# Patient Record
Sex: FEMALE | Race: WHITE | NOT HISPANIC OR LATINO | URBAN - METROPOLITAN AREA
[De-identification: names, ages, dates, MRNs, and addresses within clinical notes are randomized per-mention and may not be internally consistent; named-entity substitution may affect disease eponyms.]

---

## 2023-08-16 ENCOUNTER — APPOINTMENT (OUTPATIENT)
Dept: RADIOLOGY | Facility: IMAGING CENTER | Age: 33
End: 2023-08-16
Attending: STUDENT IN AN ORGANIZED HEALTH CARE EDUCATION/TRAINING PROGRAM
Payer: COMMERCIAL

## 2023-08-16 ENCOUNTER — OFFICE VISIT (OUTPATIENT)
Dept: URGENT CARE | Facility: CLINIC | Age: 33
End: 2023-08-16
Payer: COMMERCIAL

## 2023-08-16 VITALS
BODY MASS INDEX: 43.32 KG/M2 | SYSTOLIC BLOOD PRESSURE: 136 MMHG | HEART RATE: 76 BPM | WEIGHT: 260 LBS | HEIGHT: 65 IN | DIASTOLIC BLOOD PRESSURE: 86 MMHG | TEMPERATURE: 97.6 F | RESPIRATION RATE: 18 BRPM | OXYGEN SATURATION: 96 %

## 2023-08-16 DIAGNOSIS — M79.672 LEFT FOOT PAIN: ICD-10-CM

## 2023-08-16 DIAGNOSIS — M77.42 METATARSALGIA OF LEFT FOOT: ICD-10-CM

## 2023-08-16 PROCEDURE — 99203 OFFICE O/P NEW LOW 30 MIN: CPT | Performed by: STUDENT IN AN ORGANIZED HEALTH CARE EDUCATION/TRAINING PROGRAM

## 2023-08-16 PROCEDURE — 3075F SYST BP GE 130 - 139MM HG: CPT | Performed by: STUDENT IN AN ORGANIZED HEALTH CARE EDUCATION/TRAINING PROGRAM

## 2023-08-16 PROCEDURE — 3079F DIAST BP 80-89 MM HG: CPT | Performed by: STUDENT IN AN ORGANIZED HEALTH CARE EDUCATION/TRAINING PROGRAM

## 2023-08-16 PROCEDURE — 73630 X-RAY EXAM OF FOOT: CPT | Mod: TC,LT | Performed by: STUDENT IN AN ORGANIZED HEALTH CARE EDUCATION/TRAINING PROGRAM

## 2023-08-16 RX ORDER — MELOXICAM 15 MG/1
TABLET ORAL
Qty: 30 TABLET | Refills: 0 | Status: SHIPPED | OUTPATIENT
Start: 2023-08-16 | End: 2023-09-28

## 2023-08-16 RX ORDER — LAMOTRIGINE 100 MG/1
TABLET ORAL
COMMUNITY
Start: 2022-07-01 | End: 2025-06-25

## 2023-08-16 RX ORDER — TRAZODONE HYDROCHLORIDE 100 MG/1
100 TABLET ORAL
COMMUNITY
Start: 2023-06-26 | End: 2025-06-25

## 2023-08-16 RX ORDER — SUMATRIPTAN 50 MG/1
TABLET, FILM COATED ORAL
COMMUNITY
Start: 2022-11-23 | End: 2023-09-28 | Stop reason: SDUPTHER

## 2023-08-16 RX ORDER — PROPRANOLOL HCL 60 MG
60 CAPSULE, EXTENDED RELEASE 24HR ORAL DAILY
COMMUNITY
Start: 2023-04-14 | End: 2023-09-28

## 2023-08-16 RX ORDER — ALBUTEROL SULFATE 90 UG/1
AEROSOL, METERED RESPIRATORY (INHALATION)
COMMUNITY
Start: 2023-06-26 | End: 2025-06-25

## 2023-08-16 ASSESSMENT — FIBROSIS 4 INDEX: FIB4 SCORE: 0.48

## 2023-08-16 NOTE — LETTER
August 16, 2023       Patient: Karina Thomason   YOB: 1990   Date of Visit: 8/16/2023         To Whom It May Concern:    Karina Thomason was seen in urgent care today. I recommend she wear comfortable tennis shoes until following up with podiatry.     If you have any questions or concerns, please don't hesitate to call [phone]          Sincerely,          Santhosh Smith D.O.  Electronically Signed

## 2023-08-17 NOTE — PROGRESS NOTES
Subjective:   CHIEF COMPLAINT  Chief Complaint   Patient presents with    Foot Injury     Left foot injury x few weeks       HPI  Karina Thomason is a 33 y.o. female who presents with a chief complaint of left foot pain x1 month.  Pain is localized to the plantar aspect of the fifth metatarsal phalangeal joint.  Patient says every weekend for the last month she has been moving from one house in [place] to a new place in [place].  Describes discomfort as sharp pain that is aggravated with weightbearing.  Symptoms improved with sitting and nonweightbearing.  She has also tried Tylenol and ibuprofen.  No trauma or injury.  No history of similar symptoms.  No bruising or swelling.  No history of surgery to her foot.    REVIEW OF SYSTEMS  General: no fever or chills  GI: no nausea or vomiting  See HPI for further details.    PAST MEDICAL HISTORY       SURGICAL HISTORY  patient denies any surgical history    ALLERGIES  Allergies   Allergen Reactions    Sulfamethoxazole W-Trimethoprim Unspecified, Nausea, Hives and Vomiting       CURRENT MEDICATIONS  Home Medications       Reviewed by Santhosh Smith D.O. (Physician) on 08/17/23 at 0823  Med List Status: <None>     Medication Last Dose Status   albuterol 108 (90 Base) MCG/ACT Aero Soln inhalation aerosol PRN Active   diclofenac sodium (VOLTAREN) 1 % Gel  Active   lamoTRIgine (LAMICTAL) 100 MG Tab Taking Active   meloxicam (MOBIC) 15 MG tablet  Active   propranolol LA (INDERAL LA) 60 MG CAPSULE SR 24 HR Taking Active   SUMAtriptan (IMITREX) 50 MG Tab Taking Active   traZODone (DESYREL) 100 MG Tab Taking Active                    SOCIAL HISTORY  Social History     Tobacco Use    Smoking status: Never    Smokeless tobacco: Never   Substance and Sexual Activity    Alcohol use: Never    Drug use: Never    Sexual activity: Not on file       FAMILY HISTORY  No family history on file.       Objective:   PHYSICAL EXAM  VITAL SIGNS: /86 (BP Location: Right arm, Patient  "Position: Sitting, BP Cuff Size: Adult)   Pulse 76   Temp 36.4 °C (97.6 °F) (Temporal)   Resp 18   Ht 1.651 m (5' 5\")   Wt 118 kg (260 lb)   SpO2 96%   BMI 43.27 kg/m²     Gen: no acute distress, normal voice  Skin: dry, intact, moist mucosal membranes  Eyes: No conjunctival injection b/l  Neck: Normal range of motion. No meningeal signs.   Lungs: No increased work of breathing.  CTAB w/ symmetric expansion  CV: RRR w/o murmurs or clicks  MSK: Left foot/ankle: No erythema, ecchymosis or edema.  No loss of ROM of the tibiotalar joint.  No focal tenderness to palpation along any osseous structures or soft tissue; unable to reproduce symptoms.  No motor or sensory deficits      RADIOLOGY RESULTS   DX-FOOT-COMPLETE 3+ LEFT    Result Date: 8/16/2023 8/16/2023 5:36 PM HISTORY/REASON FOR EXAM:  Atraumatic Pain/Swelling/Deformity TECHNIQUE/EXAM DESCRIPTION AND NUMBER OF VIEWS: 3 views of the LEFT foot. COMPARISON:  None. FINDINGS:  No acute fracture is noted. There is no dislocation.  No bone erosion is noted.     No acute osseous abnormality.             Assessment/Plan:     1. Left foot pain  DX-FOOT-COMPLETE 3+ LEFT      2. Metatarsalgia of left foot  meloxicam (MOBIC) 15 MG tablet    Referral to Podiatry    diclofenac sodium (VOLTAREN) 1 % Gel      X-rays negative for any acute osseous abnormalities.  -Ordered Mobic  -Ordered Voltaren gel  -Ordered referral to podiatry  -Instructed to wear supportive shoes with insoles; provided a note for her employer  - Return to urgent care any new/worsening symptoms or further questions or concerns.  Patient understood everything discussed.  All questions were answered.      Differential diagnosis and supportive care discussed. Follow-up as needed if symptoms worsen or fail to improve to PCP, Urgent care or Emergency Room.    Please note that this dictation was created using voice recognition software. I have made a reasonable attempt to correct obvious errors, but I expect " that there are errors of grammar and possibly content that I did not discover before finalizing the note.

## 2023-09-27 SDOH — ECONOMIC STABILITY: TRANSPORTATION INSECURITY
IN THE PAST 12 MONTHS, HAS THE LACK OF TRANSPORTATION KEPT YOU FROM MEDICAL APPOINTMENTS OR FROM GETTING MEDICATIONS?: NO

## 2023-09-27 SDOH — ECONOMIC STABILITY: HOUSING INSECURITY
IN THE LAST 12 MONTHS, WAS THERE A TIME WHEN YOU DID NOT HAVE A STEADY PLACE TO SLEEP OR SLEPT IN A SHELTER (INCLUDING NOW)?: NO

## 2023-09-27 SDOH — ECONOMIC STABILITY: TRANSPORTATION INSECURITY
IN THE PAST 12 MONTHS, HAS LACK OF TRANSPORTATION KEPT YOU FROM MEETINGS, WORK, OR FROM GETTING THINGS NEEDED FOR DAILY LIVING?: NO

## 2023-09-27 SDOH — HEALTH STABILITY: PHYSICAL HEALTH: ON AVERAGE, HOW MANY MINUTES DO YOU ENGAGE IN EXERCISE AT THIS LEVEL?: 0 MIN

## 2023-09-27 SDOH — ECONOMIC STABILITY: HOUSING INSECURITY: IN THE LAST 12 MONTHS, HOW MANY PLACES HAVE YOU LIVED?: 2

## 2023-09-27 SDOH — ECONOMIC STABILITY: INCOME INSECURITY: IN THE LAST 12 MONTHS, WAS THERE A TIME WHEN YOU WERE NOT ABLE TO PAY THE MORTGAGE OR RENT ON TIME?: NO

## 2023-09-27 SDOH — ECONOMIC STABILITY: FOOD INSECURITY: WITHIN THE PAST 12 MONTHS, YOU WORRIED THAT YOUR FOOD WOULD RUN OUT BEFORE YOU GOT MONEY TO BUY MORE.: NEVER TRUE

## 2023-09-27 SDOH — ECONOMIC STABILITY: TRANSPORTATION INSECURITY
IN THE PAST 12 MONTHS, HAS LACK OF RELIABLE TRANSPORTATION KEPT YOU FROM MEDICAL APPOINTMENTS, MEETINGS, WORK OR FROM GETTING THINGS NEEDED FOR DAILY LIVING?: NO

## 2023-09-27 SDOH — ECONOMIC STABILITY: FOOD INSECURITY: WITHIN THE PAST 12 MONTHS, THE FOOD YOU BOUGHT JUST DIDN'T LAST AND YOU DIDN'T HAVE MONEY TO GET MORE.: NEVER TRUE

## 2023-09-27 SDOH — ECONOMIC STABILITY: INCOME INSECURITY: HOW HARD IS IT FOR YOU TO PAY FOR THE VERY BASICS LIKE FOOD, HOUSING, MEDICAL CARE, AND HEATING?: NOT HARD AT ALL

## 2023-09-27 SDOH — HEALTH STABILITY: PHYSICAL HEALTH: ON AVERAGE, HOW MANY DAYS PER WEEK DO YOU ENGAGE IN MODERATE TO STRENUOUS EXERCISE (LIKE A BRISK WALK)?: 0 DAYS

## 2023-09-27 SDOH — HEALTH STABILITY: MENTAL HEALTH
STRESS IS WHEN SOMEONE FEELS TENSE, NERVOUS, ANXIOUS, OR CAN'T SLEEP AT NIGHT BECAUSE THEIR MIND IS TROUBLED. HOW STRESSED ARE YOU?: TO SOME EXTENT

## 2023-09-27 ASSESSMENT — SOCIAL DETERMINANTS OF HEALTH (SDOH)
HOW OFTEN DO YOU HAVE SIX OR MORE DRINKS ON ONE OCCASION: NEVER
WITHIN THE PAST 12 MONTHS, YOU WORRIED THAT YOUR FOOD WOULD RUN OUT BEFORE YOU GOT THE MONEY TO BUY MORE: NEVER TRUE
ARE YOU MARRIED, WIDOWED, DIVORCED, SEPARATED, NEVER MARRIED, OR LIVING WITH A PARTNER?: LIVING WITH PARTNER
HOW OFTEN DO YOU ATTEND CHURCH OR RELIGIOUS SERVICES?: NEVER
DO YOU BELONG TO ANY CLUBS OR ORGANIZATIONS SUCH AS CHURCH GROUPS UNIONS, FRATERNAL OR ATHLETIC GROUPS, OR SCHOOL GROUPS?: NO
HOW OFTEN DO YOU GET TOGETHER WITH FRIENDS OR RELATIVES?: NEVER
IN A TYPICAL WEEK, HOW MANY TIMES DO YOU TALK ON THE PHONE WITH FAMILY, FRIENDS, OR NEIGHBORS?: NEVER
HOW OFTEN DO YOU ATTENT MEETINGS OF THE CLUB OR ORGANIZATION YOU BELONG TO?: NEVER
HOW OFTEN DO YOU GET TOGETHER WITH FRIENDS OR RELATIVES?: NEVER
IN A TYPICAL WEEK, HOW MANY TIMES DO YOU TALK ON THE PHONE WITH FAMILY, FRIENDS, OR NEIGHBORS?: NEVER
DO YOU BELONG TO ANY CLUBS OR ORGANIZATIONS SUCH AS CHURCH GROUPS UNIONS, FRATERNAL OR ATHLETIC GROUPS, OR SCHOOL GROUPS?: NO
ARE YOU MARRIED, WIDOWED, DIVORCED, SEPARATED, NEVER MARRIED, OR LIVING WITH A PARTNER?: LIVING WITH PARTNER
HOW MANY DRINKS CONTAINING ALCOHOL DO YOU HAVE ON A TYPICAL DAY WHEN YOU ARE DRINKING: 1 OR 2
HOW OFTEN DO YOU ATTEND CHURCH OR RELIGIOUS SERVICES?: NEVER
HOW OFTEN DO YOU ATTENT MEETINGS OF THE CLUB OR ORGANIZATION YOU BELONG TO?: NEVER
HOW HARD IS IT FOR YOU TO PAY FOR THE VERY BASICS LIKE FOOD, HOUSING, MEDICAL CARE, AND HEATING?: NOT HARD AT ALL
HOW OFTEN DO YOU HAVE A DRINK CONTAINING ALCOHOL: MONTHLY OR LESS

## 2023-09-27 ASSESSMENT — LIFESTYLE VARIABLES
HOW OFTEN DO YOU HAVE A DRINK CONTAINING ALCOHOL: MONTHLY OR LESS
SKIP TO QUESTIONS 9-10: 1
HOW MANY STANDARD DRINKS CONTAINING ALCOHOL DO YOU HAVE ON A TYPICAL DAY: 1 OR 2
AUDIT-C TOTAL SCORE: 1
HOW OFTEN DO YOU HAVE SIX OR MORE DRINKS ON ONE OCCASION: NEVER

## 2023-09-28 ENCOUNTER — OFFICE VISIT (OUTPATIENT)
Dept: MEDICAL GROUP | Facility: PHYSICIAN GROUP | Age: 33
End: 2023-09-28
Payer: COMMERCIAL

## 2023-09-28 VITALS
HEART RATE: 71 BPM | TEMPERATURE: 97.4 F | DIASTOLIC BLOOD PRESSURE: 76 MMHG | RESPIRATION RATE: 16 BRPM | HEIGHT: 65 IN | OXYGEN SATURATION: 96 % | WEIGHT: 260.36 LBS | BODY MASS INDEX: 43.38 KG/M2 | SYSTOLIC BLOOD PRESSURE: 126 MMHG

## 2023-09-28 DIAGNOSIS — G47.09 OTHER INSOMNIA: ICD-10-CM

## 2023-09-28 DIAGNOSIS — F31.81 BIPOLAR 2 DISORDER (HCC): ICD-10-CM

## 2023-09-28 DIAGNOSIS — Z80.3 FAMILY HISTORY OF BREAST CANCER: ICD-10-CM

## 2023-09-28 DIAGNOSIS — Z97.5 IUD (INTRAUTERINE DEVICE) IN PLACE: ICD-10-CM

## 2023-09-28 DIAGNOSIS — G43.819 OTHER MIGRAINE WITHOUT STATUS MIGRAINOSUS, INTRACTABLE: ICD-10-CM

## 2023-09-28 PROBLEM — G43.909 MIGRAINE: Status: ACTIVE | Noted: 2023-09-28

## 2023-09-28 PROCEDURE — 3074F SYST BP LT 130 MM HG: CPT | Performed by: STUDENT IN AN ORGANIZED HEALTH CARE EDUCATION/TRAINING PROGRAM

## 2023-09-28 PROCEDURE — 99395 PREV VISIT EST AGE 18-39: CPT | Performed by: STUDENT IN AN ORGANIZED HEALTH CARE EDUCATION/TRAINING PROGRAM

## 2023-09-28 PROCEDURE — 99214 OFFICE O/P EST MOD 30 MIN: CPT | Mod: 25 | Performed by: STUDENT IN AN ORGANIZED HEALTH CARE EDUCATION/TRAINING PROGRAM

## 2023-09-28 PROCEDURE — 3078F DIAST BP <80 MM HG: CPT | Performed by: STUDENT IN AN ORGANIZED HEALTH CARE EDUCATION/TRAINING PROGRAM

## 2023-09-28 RX ORDER — LITHIUM CARBONATE 300 MG
300 TABLET ORAL
COMMUNITY
End: 2023-09-28

## 2023-09-28 RX ORDER — PROPRANOLOL HYDROCHLORIDE 80 MG/1
80 CAPSULE, EXTENDED RELEASE ORAL DAILY
Qty: 30 CAPSULE | Refills: 11 | Status: SHIPPED | OUTPATIENT
Start: 2023-09-28 | End: 2023-10-23 | Stop reason: SDUPTHER

## 2023-09-28 RX ORDER — SUMATRIPTAN 50 MG/1
TABLET, FILM COATED ORAL
Qty: 20 TABLET | Refills: 2 | Status: SHIPPED | OUTPATIENT
Start: 2023-09-28 | End: 2023-10-24 | Stop reason: SDUPTHER

## 2023-09-28 RX ORDER — LORATADINE 10 MG/1
TABLET ORAL
COMMUNITY
End: 2023-09-28

## 2023-09-28 ASSESSMENT — ENCOUNTER SYMPTOMS
SHORTNESS OF BREATH: 0
COUGH: 0
CHILLS: 0
HEADACHES: 1
NAUSEA: 1
WHEEZING: 0
FOCAL WEAKNESS: 0
PALPITATIONS: 0
ORTHOPNEA: 0
FEVER: 0
DIZZINESS: 0
HEARTBURN: 0
ABDOMINAL PAIN: 0
BLOOD IN STOOL: 0

## 2023-09-28 ASSESSMENT — FIBROSIS 4 INDEX: FIB4 SCORE: 0.48

## 2023-09-28 ASSESSMENT — PATIENT HEALTH QUESTIONNAIRE - PHQ9: CLINICAL INTERPRETATION OF PHQ2 SCORE: 0

## 2023-09-28 NOTE — ASSESSMENT & PLAN NOTE
Increase propanolol to 80 mg extended release  Refill Imitrex  Return to care if migraine frequency still not controlled, consider titrating up propanolol    Chronic condition and exacerbation with medical management

## 2023-09-28 NOTE — PROGRESS NOTES
Subjective:   HISTORY OF THE PRESENT ILLNESS: Patient is a 33 y.o. female here today to establish care. His/her prior PCP was [facility] (formerly [facility])   Problem   Migraine    history of migraines or without aura.  Reports that she has been having frequent migraines the past couple months: Migraines twice per week.  Her propanolol is not helping much, she is also out of Imitrex and needs a refill..         Bipolar 2 Disorder (Hcc)    History of bipolar 2 disorder.  Was seen a psychiatrist in [facility].  She was just switched to Lamictal which seems to be helping her, her previous mood stabilizer was lithium however she had a reaction to this.  She needs a new referral to a psychiatrist     Other Insomnia   IUD (Intrauterine Device) in Place    5 years ago. mirena       Family History of Breast Cancer    Mother with breast cancer  Negative genetic testig.             Review of systems:  Review of Systems   Constitutional:  Negative for chills and fever.   Respiratory:  Negative for cough, shortness of breath and wheezing.    Cardiovascular:  Negative for chest pain, palpitations, orthopnea and leg swelling.   Gastrointestinal:  Positive for nausea. Negative for abdominal pain, blood in stool, heartburn and melena.   Genitourinary:  Negative for dysuria.   Musculoskeletal:  Negative for joint pain.   Neurological:  Positive for headaches. Negative for dizziness and focal weakness.         Patient Active Problem List    Diagnosis Date Noted    Migraine 09/28/2023    Bipolar 2 disorder (HCC) 09/28/2023    Other insomnia 09/28/2023    IUD (intrauterine device) in place 09/28/2023    Family history of breast cancer 09/28/2023     History reviewed. No pertinent surgical history.  Social History     Tobacco Use    Smoking status: Never    Smokeless tobacco: Never   Vaping Use    Vaping Use: Never used   Substance Use Topics    Alcohol use: Never    Drug use: Never      Family History   Problem Relation Age of Onset     "Breast Cancer Mother         No genetic marker    Psychiatric Illness Mother     Alcohol abuse Father         In recovery    Hypertension Brother     Psychiatric Illness Paternal Grandmother      Current Outpatient Medications   Medication Sig Dispense Refill    levonorgestrel (MIRENA) 20 MCG/DAY IUD by Intrauterine route.      propranolol CR (INDERAL LA) 80 MG CAPSULE SR 24 HR Take 1 Capsule by mouth every day. 30 Capsule 11    SUMAtriptan (IMITREX) 50 MG Tab Take 1 tablet by mouth at onset of migraine headache. May repeat every 2 hours if migraine is not relieve. Do not exceed 4 tablets in 24 hours. 20 Tablet 2    lamoTRIgine (LAMICTAL) 100 MG Tab Take 2 tablets by mouth 2 times per day      traZODone (DESYREL) 100 MG Tab Take 100 mg by mouth.      albuterol 108 (90 Base) MCG/ACT Aero Soln inhalation aerosol Inhale 2 puffs by mouth 30 minutes before sports or exercise to prevent cough or wheezing. May repeat every 4 hours as needed for cough or wheezing. 100 days supply is 1 canister       No current facility-administered medications for this visit.       Allergies:  Allergies   Allergen Reactions    Avocado Unspecified, Nausea and Vomiting    Sulfamethoxazole W-Trimethoprim Unspecified, Nausea, Hives and Vomiting    Sulfa Drugs Unspecified and Rash       Allergies, past medical history, past surgical history, family history, social history reviewed and updated.    Objective:    /76 (BP Location: Right arm, Patient Position: Sitting, BP Cuff Size: Large adult)   Pulse 71   Temp 36.3 °C (97.4 °F) (Temporal)   Resp 16   Ht 1.651 m (5' 5\")   Wt 118 kg (260 lb 5.8 oz)   SpO2 96%   BMI 43.33 kg/m²    Body mass index is 43.33 kg/m².    Physical exam:  General: Normal appearance, no acute distress, not ill-appearing  HEENT: EOM intact, conjunctiva normal limits, negative right/left eye discharge.  Sclera anicteric  Cardiovascular: Normal rate and rhythm, no murmurs  Pulmonary: No respiratory distress, no " wheezing, no rales, breath sounds normal.  Musculoskeletal: No edema bilaterally  Skin: Warm, dry, no lesions  Neurological: No focal deficits, normal gait  Psychiatric: Mood within normal limits    Assessment/Plan:    Patient here for a preventive medicine visit today and to establish care.  -Reviewed all past medical history, family history, social history    -Diet and exercise appropriate counseling given  -Social determinants of health reviewed  -Tobacco, alcohol, recreational drug use: Reviewed no concerns  -Cholesterol screening: We will order with yearly labs in 6 months.  -Diabetes screening: Hemoglobin A1c from 5 months ago 5.3      Immunizations/Infectious disease:  STI screening: Declines  Safe sex practices discusssed  HIV screening: Declines  Immunizations: Declines flu shot today.    Cancer screenings:  Colorectal cancer screening: No family history of colon cancer  Cervical Cancer Screening: Last Pap smear 2021 negative HPV, negative cytology   Breast Cancer Screening: Mother with breast cancer who had genetic screening was negative.  .            Ob-Gyn/ History:   /Para: G0, P0  Hx of abnormal Pap smears: One-time, colonoscopy was done subsequent normal  Gyn Surgery: No  Current Contraceptive Method: Mirena IUD placed 5 years ago      Problem List Items Addressed This Visit       Migraine     Increase propanolol to 80 mg extended release  Refill Imitrex  Return to care if migraine frequency still not controlled, consider titrating up propanolol    Chronic condition and exacerbation with medical management         Relevant Medications    propranolol CR (INDERAL LA) 80 MG CAPSULE SR 24 HR    SUMAtriptan (IMITREX) 50 MG Tab    Bipolar 2 disorder (HCC)     Chronic condition, stable.  Referral to psychiatry placed.  Continue Lamictal 200 mg twice daily         Relevant Orders    Referral to Psychiatry    Other insomnia    IUD (intrauterine device) in place    Family history of breast  cancer        Patient Counseling:  --Discussed moderation in caloric intake, sufficient fresh fruits/vegetables, fiber, iron,  --Discussed brushing, flossing, and dental visits.   --Encouraged regular exercise.   --Discussed tobacco, alcohol, or other drug use.  --Discussed sexually transmitted infections, partner selection, use of condoms  --Injury prevention: Discussed         Return in about 6 months (around 3/28/2024), or if symptoms worsen or fail to improve, for annual.

## 2023-10-23 DIAGNOSIS — G43.819 OTHER MIGRAINE WITHOUT STATUS MIGRAINOSUS, INTRACTABLE: ICD-10-CM

## 2023-10-23 NOTE — TELEPHONE ENCOUNTER
Received request via: Pharmacy    Was the patient seen in the last year in this department? Yes    Does the patient have an active prescription (recently filled or refills available) for medication(s) requested? No    Does the patient have assisted Plus and need 100 day supply (blood pressure, diabetes and cholesterol meds only)? Patient does not have SCP    Last office visit 9/28/23  Last labs 04/27/23

## 2023-10-24 ENCOUNTER — PATIENT MESSAGE (OUTPATIENT)
Dept: MEDICAL GROUP | Facility: PHYSICIAN GROUP | Age: 33
End: 2023-10-24
Payer: COMMERCIAL

## 2023-10-24 DIAGNOSIS — G43.819 OTHER MIGRAINE WITHOUT STATUS MIGRAINOSUS, INTRACTABLE: ICD-10-CM

## 2023-10-24 RX ORDER — SUMATRIPTAN 50 MG/1
TABLET, FILM COATED ORAL
Qty: 20 TABLET | Refills: 2 | Status: SHIPPED | OUTPATIENT
Start: 2023-10-24

## 2023-10-24 RX ORDER — PROPRANOLOL HYDROCHLORIDE 80 MG/1
80 CAPSULE, EXTENDED RELEASE ORAL DAILY
Qty: 30 CAPSULE | Refills: 11 | Status: SHIPPED | OUTPATIENT
Start: 2023-10-24

## 2023-10-24 RX ORDER — PROPRANOLOL HYDROCHLORIDE 80 MG/1
80 CAPSULE, EXTENDED RELEASE ORAL DAILY
Qty: 30 CAPSULE | Refills: 11 | Status: SHIPPED | OUTPATIENT
Start: 2023-10-24 | End: 2023-10-24 | Stop reason: SDUPTHER

## 2023-10-24 NOTE — PATIENT COMMUNICATION
Pt requesting medication to be sent to [facility] instead.     Received request via: Patient    Was the patient seen in the last year in this department? Yes    Does the patient have an active prescription (recently filled or refills available) for medication(s) requested? No    Does the patient have [facility] and need 100 day supply (blood pressure, diabetes and cholesterol meds only)? Patient does not have SCP

## 2024-01-27 ENCOUNTER — OFFICE VISIT (OUTPATIENT)
Dept: URGENT CARE | Facility: CLINIC | Age: 34
End: 2024-01-27
Payer: COMMERCIAL

## 2024-01-27 VITALS
SYSTOLIC BLOOD PRESSURE: 118 MMHG | RESPIRATION RATE: 16 BRPM | WEIGHT: 248.6 LBS | OXYGEN SATURATION: 96 % | HEART RATE: 83 BPM | DIASTOLIC BLOOD PRESSURE: 76 MMHG | HEIGHT: 65 IN | BODY MASS INDEX: 41.42 KG/M2 | TEMPERATURE: 98.2 F

## 2024-01-27 DIAGNOSIS — J06.9 VIRAL URI WITH COUGH: ICD-10-CM

## 2024-01-27 PROCEDURE — 99213 OFFICE O/P EST LOW 20 MIN: CPT | Performed by: FAMILY MEDICINE

## 2024-01-27 PROCEDURE — 3078F DIAST BP <80 MM HG: CPT | Performed by: FAMILY MEDICINE

## 2024-01-27 PROCEDURE — 3074F SYST BP LT 130 MM HG: CPT | Performed by: FAMILY MEDICINE

## 2024-01-27 RX ORDER — BENZONATATE 100 MG/1
100 CAPSULE ORAL 3 TIMES DAILY PRN
Qty: 60 CAPSULE | Refills: 0 | Status: SHIPPED | OUTPATIENT
Start: 2024-01-27

## 2024-01-27 ASSESSMENT — ENCOUNTER SYMPTOMS
COUGH: 1
MYALGIAS: 1

## 2024-01-27 ASSESSMENT — FIBROSIS 4 INDEX: FIB4 SCORE: 0.5

## 2024-01-27 NOTE — PROGRESS NOTES
Subjective:     Karina Thomason is a 34 y.o. female who presents for Cough (Patient coming in for body aches, heavy breathing, coughing fits, shortness of breathe, inflammation x 5 days / )    HPI  Pt presents for evaluation of an acute problem  Patient here for evaluation of an acute illness for about 5 days  Having cough which is productive with clear sputum   Feels short of breath at times  Having myalgias  Has rhinorrhea, minimal nasal congestion   Sleep is poor due to cough   Recently treated with prednisone and tessalon for a viral URI last month     Review of Systems   Constitutional:  Positive for malaise/fatigue.   HENT:  Positive for congestion.    Respiratory:  Positive for cough.    Musculoskeletal:  Positive for myalgias.       PMH:  has a past medical history of Anxiety, Asthma, Depression, and Migraine.  MEDS:   Current Outpatient Medications:     benzonatate (TESSALON) 100 MG Cap, Take 1 Capsule by mouth 3 times a day as needed for Cough., Disp: 60 Capsule, Rfl: 0    SUMAtriptan (IMITREX) 50 MG Tab, Take 1 tablet by mouth at onset of migraine headache. May repeat every 2 hours if migraine is not relieve. Do not exceed 4 tablets in 24 hours., Disp: 20 Tablet, Rfl: 2    propranolol CR (INDERAL LA) 80 MG CAPSULE SR 24 HR, Take 1 Capsule by mouth every day., Disp: 30 Capsule, Rfl: 11    levonorgestrel (MIRENA) 20 MCG/DAY IUD, by Intrauterine route., Disp: , Rfl:     lamoTRIgine (LAMICTAL) 100 MG Tab, Take 2 tablets by mouth 2 times per day, Disp: , Rfl:     traZODone (DESYREL) 100 MG Tab, Take 100 mg by mouth., Disp: , Rfl:     albuterol 108 (90 Base) MCG/ACT Aero Soln inhalation aerosol, Inhale 2 puffs by mouth 30 minutes before sports or exercise to prevent cough or wheezing. May repeat every 4 hours as needed for cough or wheezing. 100 days supply is 1 canister, Disp: , Rfl:   ALLERGIES:   Allergies   Allergen Reactions    Avocado Unspecified, Nausea and Vomiting    Sulfamethoxazole W-Trimethoprim  "Unspecified, Nausea, Hives and Vomiting    Sulfa Drugs Unspecified and Rash     SURGHX: History reviewed. No pertinent surgical history.  SOCHX:  reports that she has never smoked. She has never used smokeless tobacco. She reports that she does not drink alcohol and does not use drugs.     Objective:   /76   Pulse 83   Temp 36.8 °C (98.2 °F) (Temporal)   Resp 16   Ht 1.651 m (5' 5\")   Wt 113 kg (248 lb 9.6 oz)   SpO2 96%   BMI 41.37 kg/m²     Physical Exam  Constitutional:       General: She is not in acute distress.     Appearance: She is well-developed. She is not diaphoretic.   HENT:      Head: Normocephalic and atraumatic.      Right Ear: Tympanic membrane, ear canal and external ear normal.      Left Ear: Tympanic membrane, ear canal and external ear normal.      Nose: Congestion present.      Mouth/Throat:      Mouth: Mucous membranes are moist.      Pharynx: Oropharynx is clear. No oropharyngeal exudate or posterior oropharyngeal erythema.   Neck:      Trachea: No tracheal deviation.   Cardiovascular:      Rate and Rhythm: Normal rate and regular rhythm.   Pulmonary:      Effort: Pulmonary effort is normal. No respiratory distress.      Breath sounds: Normal breath sounds. No wheezing or rales.   Musculoskeletal:      Cervical back: Normal range of motion and neck supple. No tenderness.   Lymphadenopathy:      Cervical: No cervical adenopathy.   Skin:     General: Skin is warm and dry.      Findings: No rash.   Neurological:      Mental Status: She is alert.         Assessment/Plan:   Assessment    1. Viral URI with cough  - benzonatate (TESSALON) 100 MG Cap; Take 1 Capsule by mouth 3 times a day as needed for Cough.  Dispense: 60 Capsule; Refill: 0    Patient with viral URI.  She is outside the treatment window for influenza and has home COVID-19 testing which was negative.  Reviewed supportive care measures and expected course of recovery.  All questions answered and will follow-up in the " urgent care as needed.

## 2024-01-29 ENCOUNTER — OFFICE VISIT (OUTPATIENT)
Dept: MEDICAL GROUP | Facility: PHYSICIAN GROUP | Age: 34
End: 2024-01-29
Payer: COMMERCIAL

## 2024-01-29 VITALS
OXYGEN SATURATION: 95 % | BODY MASS INDEX: 41.02 KG/M2 | DIASTOLIC BLOOD PRESSURE: 84 MMHG | HEART RATE: 74 BPM | HEIGHT: 65 IN | WEIGHT: 246.2 LBS | SYSTOLIC BLOOD PRESSURE: 120 MMHG | TEMPERATURE: 98 F

## 2024-01-29 DIAGNOSIS — J40 BRONCHITIS: ICD-10-CM

## 2024-01-29 LAB
FLUAV RNA SPEC QL NAA+PROBE: NEGATIVE
FLUBV RNA SPEC QL NAA+PROBE: NEGATIVE
RSV RNA SPEC QL NAA+PROBE: NEGATIVE
SARS-COV-2 RNA RESP QL NAA+PROBE: NEGATIVE

## 2024-01-29 PROCEDURE — 0241U POCT CEPHEID COV-2, FLU A/B, RSV - PCR: CPT | Performed by: FAMILY MEDICINE

## 2024-01-29 PROCEDURE — 3079F DIAST BP 80-89 MM HG: CPT | Performed by: FAMILY MEDICINE

## 2024-01-29 PROCEDURE — 99214 OFFICE O/P EST MOD 30 MIN: CPT | Performed by: FAMILY MEDICINE

## 2024-01-29 PROCEDURE — 3074F SYST BP LT 130 MM HG: CPT | Performed by: FAMILY MEDICINE

## 2024-01-29 RX ORDER — AMOXICILLIN 500 MG/1
500 CAPSULE ORAL 3 TIMES DAILY
Qty: 30 CAPSULE | Refills: 0 | Status: SHIPPED | OUTPATIENT
Start: 2024-01-29

## 2024-01-29 RX ORDER — NAPROXEN 500 MG/1
TABLET ORAL
COMMUNITY
Start: 2024-01-25

## 2024-01-29 RX ORDER — METHYLPREDNISOLONE 4 MG/1
TABLET ORAL
COMMUNITY
Start: 2024-01-04 | End: 2024-01-29 | Stop reason: SDUPTHER

## 2024-01-29 RX ORDER — METHYLPREDNISOLONE 4 MG/1
TABLET ORAL
Qty: 21 TABLET | Refills: 0 | Status: SHIPPED | OUTPATIENT
Start: 2024-01-29

## 2024-01-29 ASSESSMENT — ENCOUNTER SYMPTOMS
DEPRESSION: 0
HEARTBURN: 0
EYES NEGATIVE: 1
CHILLS: 0
PSYCHIATRIC NEGATIVE: 1
HEMOPTYSIS: 0
NAUSEA: 0
DOUBLE VISION: 0
CARDIOVASCULAR NEGATIVE: 1
CONSTITUTIONAL NEGATIVE: 1
PALPITATIONS: 0
SPUTUM PRODUCTION: 1
MUSCULOSKELETAL NEGATIVE: 1
WHEEZING: 1
COUGH: 1
BLURRED VISION: 0
BRUISES/BLEEDS EASILY: 0
MYALGIAS: 0
GASTROINTESTINAL NEGATIVE: 1
NEUROLOGICAL NEGATIVE: 1
DIZZINESS: 0
FEVER: 0
TINGLING: 0
HEADACHES: 0

## 2024-01-29 ASSESSMENT — FIBROSIS 4 INDEX: FIB4 SCORE: 0.5

## 2024-01-29 NOTE — PROGRESS NOTES
Subjective     Sara Thomason is a 34 y.o. female who presents with Cough (12/28 /Sinus congestion, cough, body aches, fatigue. At home negative covid test result )            Productive cough for 3 weeks,  Neg screen,   1. Bronchitis     amoxicillin (AMOXIL) 500 MG Cap; Take 1 Capsule by mouth 3 times a day.  Dispense: 30 Capsule; Refill: 0   methylPREDNISolone (MEDROL DOSEPAK) 4 MG Tablet Therapy Pack; Dose pack  Dispense: 21 Tablet; Refill: 0   POCT Cepheid CoV-2, Flu A/B, RSV - PCR    Past Medical History:  No date: Anxiety  No date: Asthma  No date: Depression  No date: Migraine  No past surgical history on file.  Social History    Tobacco Use      Smoking status: Never      Smokeless tobacco: Never    Vaping Use      Vaping Use: Never used    Alcohol use: Never    Drug use: Never    Review of patient's family history indicates:  Problem: Breast Cancer      Relation: Mother          Age of Onset: (Not Specified)          Comment: No genetic marker  Problem: Psychiatric Illness      Relation: Mother          Age of Onset: (Not Specified)  Problem: Alcohol abuse      Relation: Father          Age of Onset: (Not Specified)          Comment: In recovery  Problem: Hypertension      Relation: Brother          Age of Onset: (Not Specified)  Problem: Psychiatric Illness      Relation: Paternal Grandmother          Age of Onset: (Not Specified)      Current Outpatient Medications: ·  naproxen (EC NAPROSYN) 500 MG EC tablet, , Disp: , Rfl: ·  amoxicillin (AMOXIL) 500 MG Cap, Take 1 Capsule by mouth 3 times a day., Disp: 30 Capsule, Rfl: 0·  methylPREDNISolone (MEDROL DOSEPAK) 4 MG Tablet Therapy Pack, Dose pack, Disp: 21 Tablet, Rfl: 0·  SUMAtriptan (IMITREX) 50 MG Tab, Take 1 tablet by mouth at onset of migraine headache. May repeat every 2 hours if migraine is not relieve. Do not exceed 4 tablets in 24 hours., Disp: 20 Tablet, Rfl: 2·  propranolol CR (INDERAL LA) 80 MG CAPSULE SR 24 HR, Take 1 Capsule by mouth every  day., Disp: 30 Capsule, Rfl: 11·  levonorgestrel (MIRENA) 20 MCG/DAY IUD, by Intrauterine route., Disp: , Rfl:  ·  lamoTRIgine (LAMICTAL) 100 MG Tab, Take 2 tablets by mouth 2 times per day, Disp: , Rfl: ·  traZODone (DESYREL) 100 MG Tab, Take 100 mg by mouth., Disp: , Rfl: ·  albuterol 108 (90 Base) MCG/ACT Aero Soln inhalation aerosol, Inhale 2 puffs by mouth 30 minutes before sports or exercise to prevent cough or wheezing. May repeat every 4 hours as needed for cough or wheezing. 100 days supply is 1 canister, Disp: , Rfl: ·  naproxen (NAPROSYN) 500 MG Tab, , Disp: , Rfl: ·  benzonatate (TESSALON) 100 MG Cap, Take 1 Capsule by mouth 3 times a day as needed for Cough. (Patient not taking: Reported on 1/29/2024), Disp: 60 Capsule, Rfl: 0    Patient was instructed on the use of medications, either prescriptions or OTC and informed on when the appropriate follow up time period should be. In addition, patient was also instructed that should any acute worsening occur that they should notify this clinic asap or call 911.              Review of Systems   Constitutional: Negative.  Negative for chills and fever.   HENT:  Positive for congestion. Negative for hearing loss.    Eyes: Negative.  Negative for blurred vision and double vision.   Respiratory:  Positive for cough, sputum production and wheezing. Negative for hemoptysis.    Cardiovascular: Negative.  Negative for chest pain and palpitations.   Gastrointestinal: Negative.  Negative for heartburn and nausea.   Genitourinary: Negative.  Negative for dysuria.   Musculoskeletal: Negative.  Negative for myalgias.   Skin: Negative.  Negative for rash.   Neurological: Negative.  Negative for dizziness, tingling and headaches.   Endo/Heme/Allergies: Negative.  Does not bruise/bleed easily.   Psychiatric/Behavioral: Negative.  Negative for depression and suicidal ideas.    All other systems reviewed and are negative.             Objective     /84   Pulse 74   Temp  "36.7 °C (98 °F) (Temporal)   Ht 1.651 m (5' 5\")   Wt 112 kg (246 lb 3.2 oz)   SpO2 95%   BMI 40.97 kg/m²      Physical Exam  Vitals and nursing note reviewed.   Constitutional:       General: She is not in acute distress.     Appearance: She is well-developed. She is not diaphoretic.   HENT:      Head: Normocephalic and atraumatic.      Mouth/Throat:      Pharynx: No oropharyngeal exudate.   Eyes:      Pupils: Pupils are equal, round, and reactive to light.   Cardiovascular:      Rate and Rhythm: Normal rate and regular rhythm.      Heart sounds: Normal heart sounds. No murmur heard.     No friction rub. No gallop.   Pulmonary:      Effort: Pulmonary effort is normal. No respiratory distress.      Breath sounds: Normal breath sounds. No wheezing or rales.   Chest:      Chest wall: No tenderness.   Neurological:      Mental Status: She is alert and oriented to person, place, and time.   Psychiatric:         Behavior: Behavior normal.         Thought Content: Thought content normal.         Judgment: Judgment normal.                             Assessment & Plan        1. Bronchitis    - amoxicillin (AMOXIL) 500 MG Cap; Take 1 Capsule by mouth 3 times a day.  Dispense: 30 Capsule; Refill: 0  - methylPREDNISolone (MEDROL DOSEPAK) 4 MG Tablet Therapy Pack; Dose pack  Dispense: 21 Tablet; Refill: 0  - POCT Cepheid CoV-2, Flu A/B, RSV - PCR                  "

## 2024-04-04 ENCOUNTER — APPOINTMENT (OUTPATIENT)
Dept: MEDICAL GROUP | Facility: PHYSICIAN GROUP | Age: 34
End: 2024-04-04
Payer: COMMERCIAL

## 2024-04-04 VITALS
RESPIRATION RATE: 16 BRPM | BODY MASS INDEX: 40 KG/M2 | HEIGHT: 65 IN | OXYGEN SATURATION: 96 % | WEIGHT: 240.08 LBS | HEART RATE: 79 BPM | DIASTOLIC BLOOD PRESSURE: 74 MMHG | SYSTOLIC BLOOD PRESSURE: 110 MMHG | TEMPERATURE: 96.5 F

## 2024-04-04 DIAGNOSIS — E66.9 OBESITY (BMI 30-39.9): ICD-10-CM

## 2024-04-04 DIAGNOSIS — Z23 NEED FOR VACCINATION: ICD-10-CM

## 2024-04-04 DIAGNOSIS — F31.81 BIPOLAR 2 DISORDER (HCC): ICD-10-CM

## 2024-04-04 PROCEDURE — 99214 OFFICE O/P EST MOD 30 MIN: CPT | Mod: 25 | Performed by: STUDENT IN AN ORGANIZED HEALTH CARE EDUCATION/TRAINING PROGRAM

## 2024-04-04 PROCEDURE — 90471 IMMUNIZATION ADMIN: CPT | Performed by: STUDENT IN AN ORGANIZED HEALTH CARE EDUCATION/TRAINING PROGRAM

## 2024-04-04 PROCEDURE — 3074F SYST BP LT 130 MM HG: CPT | Performed by: STUDENT IN AN ORGANIZED HEALTH CARE EDUCATION/TRAINING PROGRAM

## 2024-04-04 PROCEDURE — 3078F DIAST BP <80 MM HG: CPT | Performed by: STUDENT IN AN ORGANIZED HEALTH CARE EDUCATION/TRAINING PROGRAM

## 2024-04-04 PROCEDURE — 90746 HEPB VACCINE 3 DOSE ADULT IM: CPT | Performed by: STUDENT IN AN ORGANIZED HEALTH CARE EDUCATION/TRAINING PROGRAM

## 2024-04-04 NOTE — PROGRESS NOTES
Verbal Consent given for [employer] recording software    HISTORY OF PRESENT ILLNESS: Karina is a pleasant 34 y.o. female, established patient who presents today to discuss medical problems as listed below:    History of Present Illness  The patient is a 34-year-old female here for a follow-up. She wants to get annual labs done and we are also here following up on her migraines.    # Migraine  The patient was last evaluated in 09/2023, during which her propranolol dosage was escalated to 80 mg to manage her migraines, which she had been experiencing bi-weekly. Currently, she reports an improvement in her condition. The frequency of Imitrex use has decreased, with only one instance since her last visit. Previously, the frequency of her migraines was frequent. She has not identified any specific triggers for these migraines, but notes that dehydration exacerbates them.    # Bipolar 2  The patient is currently under the care of a psychiatrist for her bipolar 2 disorder. She reports experiencing significant stress, which has resulted in mood swings. Her current medication regimen includes Lamictal 200 mg, administered twice daily, and trazodone for sleep.    # Obesity BMI 39  The patient has experienced weight loss over the past year, from 260 pounds upon her relocation. She has implemented dietary modifications, including reduced food intake and intermittent fasting, and has lost two pants sizes. She does not engage in regular exercise, but intends to incorporate this into her routine. She denies experiencing chest pain, excessive shortness of breath, or leg swelling during exercise. However, she does report leg swelling during prolonged periods of sitting.       Current Outpatient Medications Ordered in Epic   Medication Sig Dispense Refill    SUMAtriptan (IMITREX) 50 MG Tab Take 1 tablet by mouth at onset of migraine headache. May repeat every 2 hours if migraine is not relieve. Do not exceed 4 tablets in 24 hours. 20  Tablet 2    propranolol CR (INDERAL LA) 80 MG CAPSULE SR 24 HR Take 1 Capsule by mouth every day. 30 Capsule 11    levonorgestrel (MIRENA) 20 MCG/DAY IUD by Intrauterine route.      lamoTRIgine (LAMICTAL) 100 MG Tab Take 2 tablets by mouth 2 times per day      traZODone (DESYREL) 100 MG Tab Take 100 mg by mouth.      albuterol 108 (90 Base) MCG/ACT Aero Soln inhalation aerosol Inhale 2 puffs by mouth 30 minutes before sports or exercise to prevent cough or wheezing. May repeat every 4 hours as needed for cough or wheezing. 100 days supply is 1 canister       No current [facility]-ordered facility-administered medications on file.       Review of systems:  Per HPI    Patient Active Problem List    Diagnosis Date Noted    Obesity (BMI 30-39.9) 04/04/2024    Migraine 09/28/2023    Bipolar 2 disorder (HCC) 09/28/2023    Other insomnia 09/28/2023    IUD (intrauterine device) in place 09/28/2023    Family history of breast cancer 09/28/2023     No past surgical history on file.  Social History     Tobacco Use    Smoking status: Never    Smokeless tobacco: Never   Vaping Use    Vaping Use: Never used   Substance Use Topics    Alcohol use: Never    Drug use: Never      Family History   Problem Relation Age of Onset    Breast Cancer Mother         No genetic marker    Psychiatric Illness Mother     Alcohol abuse Father         In recovery    Hypertension Brother     Psychiatric Illness Paternal Grandmother      Current Outpatient Medications   Medication Sig Dispense Refill    SUMAtriptan (IMITREX) 50 MG Tab Take 1 tablet by mouth at onset of migraine headache. May repeat every 2 hours if migraine is not relieve. Do not exceed 4 tablets in 24 hours. 20 Tablet 2    propranolol CR (INDERAL LA) 80 MG CAPSULE SR 24 HR Take 1 Capsule by mouth every day. 30 Capsule 11    levonorgestrel (MIRENA) 20 MCG/DAY IUD by Intrauterine route.      lamoTRIgine (LAMICTAL) 100 MG Tab Take 2 tablets by mouth 2 times per day      traZODone (DESYREL)  "100 MG Tab Take 100 mg by mouth.      albuterol 108 (90 Base) MCG/ACT Aero Soln inhalation aerosol Inhale 2 puffs by mouth 30 minutes before sports or exercise to prevent cough or wheezing. May repeat every 4 hours as needed for cough or wheezing. 100 days supply is 1 canister       No current facility-administered medications for this visit.       Allergies:  Allergies   Allergen Reactions    Avocado Unspecified, Nausea and Vomiting    Sulfamethoxazole W-Trimethoprim Unspecified, Nausea, Hives and Vomiting    Sulfa Drugs Unspecified and Rash       Allergies, past medical history, past surgical history, family history, social history reviewed and updated.    Objective:    /74   Pulse 79   Temp 35.8 °C (96.5 °F) (Temporal)   Resp 16   Ht 1.651 m (5' 5\")   Wt 109 kg (240 lb 1.3 oz)   SpO2 96%   BMI 39.95 kg/m²    Body mass index is 39.95 kg/m².    Physical exam:  General: Normal appearance, no acute distress, not ill-appearing  HEENT: EOM intact, conjunctiva normal limits, negative right/left eye discharge.  Sclera anicteric  Cardiovascular: Normal rate and rhythm, no murmurs  Pulmonary: No respiratory distress, no wheezing, no rales, breath sounds normal.  Musculoskeletal: No edema bilaterally  Skin: Warm, dry, no lesions  Neurological: No focal deficits, normal gait  Psychiatric: Mood within normal limits    Assessment/Plan:    Assessment & Plan  1. Migraines.  Chronic stable condition.  Continue propranolol 80 mg daily, Imitrex as needed    2. Bipolar 2 disorder.  The patient's bipolar 2 disorder is well-managed under the care of a psychiatrist.  Continue Lamictal 100 mg 2 tablets twice daily    3. Annual labs.  The patient has experienced a weight loss of 20 pounds. A diabetic screening and a cholesterol panel will be ordered. The patient is advised to fast for these labs. The third dose of the hepatitis B vaccine will be administered today.    Follow-up  The patient is scheduled for a follow-up visit " in 09/2024 for her annual visit.       Problem List Items Addressed This Visit       Bipolar 2 disorder (HCC)    Relevant Orders    CBC WITHOUT DIFFERENTIAL    Comp Metabolic Panel    TSH WITH REFLEX TO FT4    Obesity (BMI 30-39.9)    Relevant Orders    HEMOGLOBIN A1C    Lipid Profile     Other Visit Diagnoses       Need for vaccination        Relevant Orders    Hepatitis B Vaccine Adult 20+            Return in about 6 months (around 10/4/2024), or if symptoms worsen or fail to improve, for annual.

## 2024-04-27 ENCOUNTER — APPOINTMENT (OUTPATIENT)
Dept: LAB | Facility: MEDICAL CENTER | Age: 34
End: 2024-04-27
Payer: COMMERCIAL

## 2024-05-18 ENCOUNTER — APPOINTMENT (OUTPATIENT)
Dept: LAB | Facility: MEDICAL CENTER | Age: 34
End: 2024-05-18
Payer: COMMERCIAL

## 2024-08-11 ENCOUNTER — OFFICE VISIT (OUTPATIENT)
Dept: URGENT CARE | Facility: CLINIC | Age: 34
End: 2024-08-11
Payer: COMMERCIAL

## 2024-08-11 VITALS
TEMPERATURE: 97.6 F | HEART RATE: 70 BPM | RESPIRATION RATE: 14 BRPM | WEIGHT: 230 LBS | OXYGEN SATURATION: 97 % | SYSTOLIC BLOOD PRESSURE: 118 MMHG | BODY MASS INDEX: 38.32 KG/M2 | HEIGHT: 65 IN | DIASTOLIC BLOOD PRESSURE: 84 MMHG

## 2024-08-11 DIAGNOSIS — Z80.0 FAMILY HX OF COLON CANCER: ICD-10-CM

## 2024-08-11 DIAGNOSIS — K64.9 HEMORRHOIDS, UNSPECIFIED HEMORRHOID TYPE: ICD-10-CM

## 2024-08-11 PROCEDURE — 3079F DIAST BP 80-89 MM HG: CPT | Performed by: REGISTERED NURSE

## 2024-08-11 PROCEDURE — 3074F SYST BP LT 130 MM HG: CPT | Performed by: REGISTERED NURSE

## 2024-08-11 PROCEDURE — 99214 OFFICE O/P EST MOD 30 MIN: CPT | Performed by: REGISTERED NURSE

## 2024-08-11 RX ORDER — HYDROCORTISONE ACETATE 25 MG/1
25 SUPPOSITORY RECTAL EVERY 12 HOURS
Qty: 12 SUPPOSITORY | Refills: 0 | Status: SHIPPED | OUTPATIENT
Start: 2024-08-11 | End: 2024-08-17

## 2024-08-11 RX ORDER — LUMATEPERONE 42 MG/1
CAPSULE ORAL
COMMUNITY
Start: 2024-07-18

## 2024-08-11 ASSESSMENT — ENCOUNTER SYMPTOMS
ABDOMINAL PAIN: 0
SHORTNESS OF BREATH: 0
FEVER: 0

## 2024-08-11 NOTE — PROGRESS NOTES
Subjective:   Karina Thomason is a 34 y.o. female who presents for Constipation (Anal pain x 2 weeks )      HPI  Pain at the rectum x 2 weeks.  Pain typically corresponds with bowel movements.  Hx of anal fissures. Has been having hard stools, currently using stool softeners. No bloody or black tarry stools. Has increased water intake, more fiber, culturelle.  Is not having any abdominal pain.  No vomiting.  No fevers chills or bodyaches.  No urinary symptoms.  Does have family history of colon cancer.      Review of Systems   Constitutional:  Negative for fever.   Respiratory:  Negative for shortness of breath.    Cardiovascular:  Negative for chest pain.   Gastrointestinal:  Negative for abdominal pain.   Skin:  Negative for rash.       Allergies   Allergen Reactions    Avocado Unspecified, Nausea and Vomiting    Sulfamethoxazole W-Trimethoprim Unspecified, Nausea, Hives and Vomiting    Sulfa Drugs Unspecified and Rash       Patient Active Problem List    Diagnosis Date Noted    Obesity (BMI 30-39.9) 04/04/2024    Migraine 09/28/2023    Bipolar 2 disorder (HCC) 09/28/2023    Other insomnia 09/28/2023    IUD (intrauterine device) in place 09/28/2023    Family history of breast cancer 09/28/2023       Current Outpatient Medications Ordered in Epic   Medication Sig Dispense Refill    CAPLYTA 42 MG Cap       hydrocortisone (ANUSOL-HC) 25 MG Suppos Insert 1 Suppository into the rectum every 12 hours for 6 days. 12 Suppository 0    SUMAtriptan (IMITREX) 50 MG Tab Take 1 tablet by mouth at onset of migraine headache. May repeat every 2 hours if migraine is not relieve. Do not exceed 4 tablets in 24 hours. 20 Tablet 2    propranolol CR (INDERAL LA) 80 MG CAPSULE SR 24 HR Take 1 Capsule by mouth every day. 30 Capsule 11    levonorgestrel (MIRENA) 20 MCG/DAY IUD by Intrauterine route.      lamoTRIgine (LAMICTAL) 100 MG Tab Take 2 tablets by mouth 2 times per day      albuterol 108 (90 Base) MCG/ACT Aero Soln inhalation  "aerosol Inhale 2 puffs by mouth 30 minutes before sports or exercise to prevent cough or wheezing. May repeat every 4 hours as needed for cough or wheezing. 100 days supply is 1 canister      traZODone (DESYREL) 100 MG Tab Take 100 mg by mouth. (Patient not taking: Reported on 8/11/2024)       No current [facility]-ordered facility-administered medications on file.       No past surgical history on file.    Social History     Tobacco Use    Smoking status: Never    Smokeless tobacco: Never   Vaping Use    Vaping status: Never Used   Substance Use Topics    Alcohol use: Never    Drug use: Never       family history includes Alcohol abuse in her father; Breast Cancer in her mother; Hypertension in her brother; Psychiatric Illness in her mother and paternal grandmother.     Problem list, medications, and allergies reviewed by myself today in Epic.     Objective:   /84   Pulse 70   Temp 36.4 °C (97.6 °F) (Temporal)   Resp 14   Ht 1.651 m (5' 5\")   Wt 104 kg (230 lb)   SpO2 97%   BMI 38.27 kg/m²     Physical Exam  Vitals and nursing note reviewed. Exam conducted with a chaperone present.   Constitutional:       Appearance: Normal appearance. She is not ill-appearing or toxic-appearing.   HENT:      Mouth/Throat:      Mouth: Mucous membranes are moist.   Eyes:      Pupils: Pupils are equal, round, and reactive to light.   Cardiovascular:      Rate and Rhythm: Normal rate and regular rhythm.   Pulmonary:      Effort: Pulmonary effort is normal. No respiratory distress.      Breath sounds: Normal breath sounds.   Abdominal:      General: Abdomen is flat.      Palpations: Abdomen is soft.      Tenderness: There is no abdominal tenderness.   Genitourinary:     Comments: No perirectal abscess.  No external hemorrhoids visualized.  No anal fissures.  Musculoskeletal:         General: Normal range of motion.      Cervical back: Normal range of motion.   Skin:     General: Skin is warm and dry.      Capillary Refill: " Capillary refill takes less than 2 seconds.      Findings: No rash.   Neurological:      General: No focal deficit present.      Mental Status: She is alert and oriented to person, place, and time.   Psychiatric:         Mood and Affect: Mood normal.         Assessment/Plan:     I personally reviewed prior external notes and test results pertinent to today's visit as well as additional imaging and testing completed in clinic today.    I introduced myself as Jarad Arias a Nurse Practitioner.    1. Hemorrhoids, unspecified hemorrhoid type  hydrocortisone (ANUSOL-HC) 25 MG Suppos    Referral to Gastroenterology      2. Family hx of colon cancer  Referral to Gastroenterology      This is a very pleasant 34-year-old with 2 weeks of intermittent anal pain typically with BMs.  She does note she has been having harder stools which worsens the symptoms.  She has a history of anal fissures also family history of colon cancer.  No unintentional weight loss.  No abdominal pain.  No bloody or black tarry or mucousy stools.  No fevers chills body aches.  Vitals are reassuring.  On exam I did not note any perirectal abscess, no external hemorrhoids and no anal fissures.  Remainder the exam is unremarkable.  Discussed likely internal hemorrhoid however I would like to place referral to gastroenterology for potential colonoscopy given her family history of colon cancer.  I did recommend continuing with increasing fluids and fiber, stool softeners.  Will trial hydrocortisone suppository.    Medication discussed included indication for use and the potential benefits and side effects. The Patient was encouraged to monitor symptoms closely, and we reviewed the signs and symptoms that require a higher level of care through the emergency department. Patient verbalized understanding.    Please note that this dictation was created using voice recognition software. I have made every reasonable attempt to correct obvious errors, but I expect  that there are errors of grammar and possibly content that I did not discover before finalizing the note.    This note was electronically signed by PREM Toro

## 2024-09-01 SDOH — ECONOMIC STABILITY: INCOME INSECURITY: HOW HARD IS IT FOR YOU TO PAY FOR THE VERY BASICS LIKE FOOD, HOUSING, MEDICAL CARE, AND HEATING?: NOT HARD AT ALL

## 2024-09-01 SDOH — HEALTH STABILITY: MENTAL HEALTH
STRESS IS WHEN SOMEONE FEELS TENSE, NERVOUS, ANXIOUS, OR CAN'T SLEEP AT NIGHT BECAUSE THEIR MIND IS TROUBLED. HOW STRESSED ARE YOU?: VERY MUCH

## 2024-09-01 SDOH — ECONOMIC STABILITY: FOOD INSECURITY: WITHIN THE PAST 12 MONTHS, THE FOOD YOU BOUGHT JUST DIDN'T LAST AND YOU DIDN'T HAVE MONEY TO GET MORE.: NEVER TRUE

## 2024-09-01 SDOH — HEALTH STABILITY: PHYSICAL HEALTH: ON AVERAGE, HOW MANY DAYS PER WEEK DO YOU ENGAGE IN MODERATE TO STRENUOUS EXERCISE (LIKE A BRISK WALK)?: 0 DAYS

## 2024-09-01 SDOH — ECONOMIC STABILITY: FOOD INSECURITY: WITHIN THE PAST 12 MONTHS, YOU WORRIED THAT YOUR FOOD WOULD RUN OUT BEFORE YOU GOT MONEY TO BUY MORE.: NEVER TRUE

## 2024-09-01 SDOH — ECONOMIC STABILITY: INCOME INSECURITY: IN THE LAST 12 MONTHS, WAS THERE A TIME WHEN YOU WERE NOT ABLE TO PAY THE MORTGAGE OR RENT ON TIME?: NO

## 2024-09-01 SDOH — HEALTH STABILITY: PHYSICAL HEALTH: ON AVERAGE, HOW MANY MINUTES DO YOU ENGAGE IN EXERCISE AT THIS LEVEL?: 0 MIN

## 2024-09-01 ASSESSMENT — SOCIAL DETERMINANTS OF HEALTH (SDOH)
HOW OFTEN DO YOU ATTENT MEETINGS OF THE CLUB OR ORGANIZATION YOU BELONG TO?: NEVER
ARE YOU MARRIED, WIDOWED, DIVORCED, SEPARATED, NEVER MARRIED, OR LIVING WITH A PARTNER?: LIVING WITH PARTNER
HOW HARD IS IT FOR YOU TO PAY FOR THE VERY BASICS LIKE FOOD, HOUSING, MEDICAL CARE, AND HEATING?: NOT HARD AT ALL
WITHIN THE PAST 12 MONTHS, YOU WORRIED THAT YOUR FOOD WOULD RUN OUT BEFORE YOU GOT THE MONEY TO BUY MORE: NEVER TRUE
HOW OFTEN DO YOU GET TOGETHER WITH FRIENDS OR RELATIVES?: ONCE A WEEK
HOW OFTEN DO YOU ATTEND CHURCH OR RELIGIOUS SERVICES?: NEVER
IN A TYPICAL WEEK, HOW MANY TIMES DO YOU TALK ON THE PHONE WITH FAMILY, FRIENDS, OR NEIGHBORS?: NEVER
IN THE PAST 12 MONTHS, HAS THE ELECTRIC, GAS, OIL, OR WATER COMPANY THREATENED TO SHUT OFF SERVICE IN YOUR HOME?: NO
DO YOU BELONG TO ANY CLUBS OR ORGANIZATIONS SUCH AS CHURCH GROUPS UNIONS, FRATERNAL OR ATHLETIC GROUPS, OR SCHOOL GROUPS?: NO
HOW OFTEN DO YOU ATTENT MEETINGS OF THE CLUB OR ORGANIZATION YOU BELONG TO?: NEVER
HOW OFTEN DO YOU GET TOGETHER WITH FRIENDS OR RELATIVES?: ONCE A WEEK
DO YOU BELONG TO ANY CLUBS OR ORGANIZATIONS SUCH AS CHURCH GROUPS UNIONS, FRATERNAL OR ATHLETIC GROUPS, OR SCHOOL GROUPS?: NO
HOW OFTEN DO YOU ATTEND CHURCH OR RELIGIOUS SERVICES?: NEVER
IN A TYPICAL WEEK, HOW MANY TIMES DO YOU TALK ON THE PHONE WITH FAMILY, FRIENDS, OR NEIGHBORS?: NEVER
HOW OFTEN DO YOU HAVE A DRINK CONTAINING ALCOHOL: MONTHLY OR LESS
ARE YOU MARRIED, WIDOWED, DIVORCED, SEPARATED, NEVER MARRIED, OR LIVING WITH A PARTNER?: LIVING WITH PARTNER
HOW OFTEN DO YOU HAVE SIX OR MORE DRINKS ON ONE OCCASION: NEVER
HOW MANY DRINKS CONTAINING ALCOHOL DO YOU HAVE ON A TYPICAL DAY WHEN YOU ARE DRINKING: 1 OR 2

## 2024-09-01 ASSESSMENT — LIFESTYLE VARIABLES
SKIP TO QUESTIONS 9-10: 1
AUDIT-C TOTAL SCORE: 1
HOW OFTEN DO YOU HAVE A DRINK CONTAINING ALCOHOL: MONTHLY OR LESS
HOW OFTEN DO YOU HAVE SIX OR MORE DRINKS ON ONE OCCASION: NEVER
HOW MANY STANDARD DRINKS CONTAINING ALCOHOL DO YOU HAVE ON A TYPICAL DAY: 1 OR 2

## 2024-09-04 ENCOUNTER — OFFICE VISIT (OUTPATIENT)
Dept: MEDICAL GROUP | Facility: PHYSICIAN GROUP | Age: 34
End: 2024-09-04
Payer: COMMERCIAL

## 2024-09-04 VITALS
WEIGHT: 229.5 LBS | SYSTOLIC BLOOD PRESSURE: 100 MMHG | HEIGHT: 65 IN | HEART RATE: 77 BPM | DIASTOLIC BLOOD PRESSURE: 74 MMHG | OXYGEN SATURATION: 96 % | RESPIRATION RATE: 16 BRPM | TEMPERATURE: 96.5 F | BODY MASS INDEX: 38.24 KG/M2

## 2024-09-04 DIAGNOSIS — F31.81 BIPOLAR 2 DISORDER (HCC): ICD-10-CM

## 2024-09-04 DIAGNOSIS — J45.990 EXERCISE-INDUCED ASTHMA: ICD-10-CM

## 2024-09-04 DIAGNOSIS — Z97.5 IUD (INTRAUTERINE DEVICE) IN PLACE: ICD-10-CM

## 2024-09-04 DIAGNOSIS — Z30.431 IUD CHECK UP: ICD-10-CM

## 2024-09-04 DIAGNOSIS — G43.819 OTHER MIGRAINE WITHOUT STATUS MIGRAINOSUS, INTRACTABLE: ICD-10-CM

## 2024-09-04 PROCEDURE — 99395 PREV VISIT EST AGE 18-39: CPT | Performed by: STUDENT IN AN ORGANIZED HEALTH CARE EDUCATION/TRAINING PROGRAM

## 2024-09-04 PROCEDURE — 3074F SYST BP LT 130 MM HG: CPT | Performed by: STUDENT IN AN ORGANIZED HEALTH CARE EDUCATION/TRAINING PROGRAM

## 2024-09-04 PROCEDURE — 3078F DIAST BP <80 MM HG: CPT | Performed by: STUDENT IN AN ORGANIZED HEALTH CARE EDUCATION/TRAINING PROGRAM

## 2024-09-04 RX ORDER — CARIPRAZINE 1.5 MG/1
CAPSULE, GELATIN COATED ORAL
COMMUNITY
Start: 2024-08-12

## 2024-09-04 RX ORDER — CEPHALEXIN 500 MG/1
CAPSULE ORAL
COMMUNITY
Start: 2024-06-24 | End: 2024-09-04

## 2024-09-04 RX ORDER — NITROFURANTOIN 25; 75 MG/1; MG/1
CAPSULE ORAL
COMMUNITY
Start: 2024-06-22

## 2024-09-04 RX ORDER — PHENAZOPYRIDINE HYDROCHLORIDE 200 MG/1
TABLET, FILM COATED ORAL
COMMUNITY
Start: 2024-06-22

## 2024-09-04 ASSESSMENT — ENCOUNTER SYMPTOMS
VOMITING: 0
HEADACHES: 0
FOCAL WEAKNESS: 0
FEVER: 0
PALPITATIONS: 0
ORTHOPNEA: 0
WHEEZING: 0
NAUSEA: 0
CHILLS: 0
HALLUCINATIONS: 0
BLOOD IN STOOL: 0
COUGH: 0
ABDOMINAL PAIN: 0
NERVOUS/ANXIOUS: 1
DIZZINESS: 0
DEPRESSION: 1
SHORTNESS OF BREATH: 0

## 2024-09-04 ASSESSMENT — LIFESTYLE VARIABLES: SUBSTANCE_ABUSE: 0

## 2024-09-04 NOTE — PROGRESS NOTES
Subjective:     CC:   Chief Complaint   Patient presents with    Annual Exam     Prevent osteoporosis        HPI:   Karina Thomason is a 34 y.o. female who presents for annual exam. She is feeling well and denies any complaints.    Ob-Gyn/ History:    Patient has GYN provider: no - refered  /Para:    Last Pap Smear:  3 y. 1 history of abnormal pap smears, then subsequent normal after colpo  Gyn Surgery:  no.  Current Contraceptive Method: mirena, 5 years ago   Last menstrual period:  every 3 w, Periods regular. light bleeding. Cramping is mild.   She do not take OTC analgesics for cramps.  No significant bloating/fluid retention, pelvic pain, or dyspareunia. No vaginal discharge  Folate intake: advised     Health Maintenance    Cholesterol Screening: ordered   Diabetes Screening: ordered   Diet: she is currently working on diet, using intermittent fasting method   Exercise: starting yoga classes   Substance Abuse: uses marijuana edibles. Occasional alcohol   Safe in relationship.  Seat belts, bike helmet, gun safety discussed.  Sun protection used.    Cancer screening  Colorectal Cancer Screening: no fam hx    Lung Cancer Screening: not indicated  Cervical Cancer Screening: due in 2024   Breast Cancer Screening:  mother with breast cancer - neg genetic testing.     Infectious disease screening/Immunizations  --STI Screening: dec   --Practices safe sex.  --HIV Screening: dec   --Hepatitis C Screening: dec   --Immunizations:    Advised on covid, influenza     She  has a past medical history of Anxiety, Asthma, Depression, and Migraine.  She  has no past surgical history on file.    Family History   Problem Relation Age of Onset    Breast Cancer Mother         No genetic marker    Psychiatric Illness Mother     Alcohol abuse Father         In recovery    Hypertension Brother     Psychiatric Illness Paternal Grandmother     Drug abuse Brother         Methamphetamines, anything he could get his hands  on    Alcohol abuse Brother        Social History     Socioeconomic History    Marital status: Single     Spouse name: Not on file    Number of children: Not on file    Years of education: Not on file    Highest education level: Associate degree: occupational, technical, or vocational program   Occupational History    Not on file   Tobacco Use    Smoking status: Never    Smokeless tobacco: Never   Vaping Use    Vaping status: Never Used   Substance and Sexual Activity    Alcohol use: Yes     Comment: 2-3/month    Drug use: Yes     Frequency: 2.0 times per week     Types: Marijuana    Sexual activity: Yes     Partners: Male     Birth control/protection: I.U.D.   Other Topics Concern    Not on file   Social History Narrative    Not on file     Social Determinants of Health     Financial Resource Strain: Low Risk  (9/1/2024)    Overall Financial Resource Strain (CARDIA)     Difficulty of Paying Living Expenses: Not hard at all   Food Insecurity: No Food Insecurity (9/1/2024)    Hunger Vital Sign     Worried About Running Out of Food in the Last Year: Never true     Ran Out of Food in the Last Year: Never true   Transportation Needs: No Transportation Needs (9/1/2024)    PRAPARE - Transportation     Lack of Transportation (Medical): No     Lack of Transportation (Non-Medical): No   Physical Activity: Inactive (9/1/2024)    Exercise Vital Sign     Days of Exercise per Week: 0 days     Minutes of Exercise per Session: 0 min   Stress: Stress Concern Present (9/1/2024)    [employer] - Occupational Stress Questionnaire     Feeling of Stress : Very much   Social Connections: Socially Isolated (9/1/2024)    Social Connection and Isolation Panel [NHANES]     Frequency of Communication with Friends and Family: Never     Frequency of Social Gatherings with Friends and Family: Once a week     Attends [employer] Services: Never     Active Member of Clubs or Organizations: No     Attends Club or  Organization Meetings: Never     Marital Status: Living with partner   Intimate Partner Violence: Not on file   Housing Stability: Low Risk  (9/1/2024)    Housing Stability Vital Sign     Unable to Pay for Housing in the Last Year: No     Number of Times Moved in the Last Year: 0     Homeless in the Last Year: No       Patient Active Problem List    Diagnosis Date Noted    Exercise-induced asthma 09/04/2024    Obesity (BMI 30-39.9) 04/04/2024    Migraine 09/28/2023    Bipolar 2 disorder (HCC) 09/28/2023    Other insomnia 09/28/2023    IUD (intrauterine device) in place 09/28/2023    Family history of breast cancer 09/28/2023         Current Outpatient Medications   Medication Sig Dispense Refill    VRAYLAR 1.5 MG capsule       nitrofurantoin (MACROBID) 100 MG Cap       phenazopyridine (PYRIDIUM) 200 MG Tab       SUMAtriptan (IMITREX) 50 MG Tab Take 1 tablet by mouth at onset of migraine headache. May repeat every 2 hours if migraine is not relieve. Do not exceed 4 tablets in 24 hours. 20 Tablet 2    propranolol CR (INDERAL LA) 80 MG CAPSULE SR 24 HR Take 1 Capsule by mouth every day. 30 Capsule 11    levonorgestrel (MIRENA) 20 MCG/DAY IUD by Intrauterine route.      lamoTRIgine (LAMICTAL) 100 MG Tab Take 2 tablets by mouth 2 times per day      traZODone (DESYREL) 100 MG Tab Take 100 mg by mouth.      albuterol 108 (90 Base) MCG/ACT Aero Soln inhalation aerosol Inhale 2 puffs by mouth 30 minutes before sports or exercise to prevent cough or wheezing. May repeat every 4 hours as needed for cough or wheezing. 100 days supply is 1 canister       No current facility-administered medications for this visit.     Allergies   Allergen Reactions    Avocado Unspecified, Nausea and Vomiting    Sulfamethoxazole W-Trimethoprim Unspecified, Nausea, Hives and Vomiting    Sulfa Drugs Unspecified and Rash       Review of Systems   Constitutional:  Negative for chills and fever.   Respiratory:  Negative for cough, shortness of breath  "and wheezing.    Cardiovascular:  Negative for chest pain, palpitations, orthopnea and leg swelling.   Gastrointestinal:  Negative for abdominal pain, blood in stool, melena, nausea and vomiting.   Genitourinary:  Negative for dysuria and hematuria.   Musculoskeletal:  Negative for joint pain.   Neurological:  Negative for dizziness, focal weakness and headaches.   Psychiatric/Behavioral:  Positive for depression and suicidal ideas. Negative for hallucinations and substance abuse. The patient is nervous/anxious.         Objective:     /74   Pulse 77   Temp 35.8 °C (96.5 °F) (Temporal)   Resp 16   Ht 1.651 m (5' 5\")   Wt 104 kg (229 lb 8 oz)   SpO2 96%   BMI 38.19 kg/m²   Body mass index is 38.19 kg/m².  Wt Readings from Last 4 Encounters:   09/04/24 104 kg (229 lb 8 oz)   08/11/24 104 kg (230 lb)   04/04/24 109 kg (240 lb 1.3 oz)   01/29/24 112 kg (246 lb 3.2 oz)       Physical Exam  Vitals reviewed.   Constitutional:       General: She is not in acute distress.     Appearance: Normal appearance. She is not ill-appearing or toxic-appearing.   HENT:      Head: Normocephalic and atraumatic.      Right Ear: Tympanic membrane, ear canal and external ear normal. There is no impacted cerumen.      Left Ear: Tympanic membrane, ear canal and external ear normal. There is no impacted cerumen.   Eyes:      General: No scleral icterus.        Right eye: No discharge.         Left eye: No discharge.      Conjunctiva/sclera: Conjunctivae normal.   Neck:      Vascular: No carotid bruit.   Cardiovascular:      Rate and Rhythm: Normal rate and regular rhythm.      Pulses: Normal pulses.      Heart sounds: Normal heart sounds. No murmur heard.  Pulmonary:      Effort: Pulmonary effort is normal. No respiratory distress.      Breath sounds: Normal breath sounds. No wheezing or rales.   Abdominal:      General: Abdomen is flat. Bowel sounds are normal.      Palpations: Abdomen is soft.   Musculoskeletal:      Cervical " back: Neck supple.      Right lower leg: No edema.      Left lower leg: No edema.   Skin:     General: Skin is warm and dry.   Neurological:      General: No focal deficit present.      Mental Status: She is alert.   Psychiatric:         Mood and Affect: Mood normal.         Thought Content: Thought content normal.          Assessment and Plan:       Problem List Items Addressed This Visit       Migraine     Chronic, stable condition.  Uses propranolol 80 mg daily for prophylaxis and Imitrex 50 mg as needed          Bipolar 2 disorder (HCC)     Chronic, stable condition.  Currently managed by psychiatry [facility] in [place].  Currently on Lamictal 200 mg twice daily, trazodone 100 mg nightly, Vraylar 1.5 mg daily.    Does report to suicidal ideations at times, psych is aware. Reports she is currently well.          IUD (intrauterine device) in place     5 years ago Mirena.         Exercise-induced asthma     Other Visit Diagnoses       IUD check up        Relevant Orders    Referral to Gynecology              Labs per orders  Immunizations per orders      Patient Counseling:  --Discussed moderation in caloric intake, sufficient fresh fruits/vegetables, fiber, iron, and 0.4-0.8mg of folate supplement per day (for females capable of pregnancy).  --Discussed brushing, flossing, and dental visits.   --Encouraged regular exercise.   --Discussed tobacco, alcohol, or other drug use.  --Discussed sexually transmitted infections, partner selection, use of condoms, avoidance of unintended pregnancy and contraceptive alternatives.  --Injury prevention: Discussed      Follow-up: Return in about 6 months (around 3/4/2025), or if symptoms worsen or fail to improve.

## 2024-09-04 NOTE — ASSESSMENT & PLAN NOTE
Chronic, stable condition.  Currently managed by psychiatry [facility] in [place].  Currently on Lamictal 200 mg twice daily, trazodone 100 mg nightly, Vraylar 1.5 mg daily.    Does report to suicidal ideations at times, psych is aware. Reports she is currently well.

## 2024-09-04 NOTE — ASSESSMENT & PLAN NOTE
Chronic, stable condition.  Uses propranolol 80 mg daily for prophylaxis and Imitrex 50 mg as needed

## 2024-09-07 ENCOUNTER — APPOINTMENT (OUTPATIENT)
Dept: LAB | Facility: MEDICAL CENTER | Age: 34
End: 2024-09-07
Payer: COMMERCIAL

## 2024-09-12 DIAGNOSIS — G43.819 OTHER MIGRAINE WITHOUT STATUS MIGRAINOSUS, INTRACTABLE: ICD-10-CM

## 2024-09-13 RX ORDER — PROPRANOLOL HYDROCHLORIDE 80 MG/1
80 CAPSULE, EXTENDED RELEASE ORAL DAILY
Qty: 90 CAPSULE | Refills: 3 | Status: SHIPPED | OUTPATIENT
Start: 2024-09-13

## 2024-10-01 ENCOUNTER — APPOINTMENT (OUTPATIENT)
Dept: MEDICAL GROUP | Facility: PHYSICIAN GROUP | Age: 34
End: 2024-10-01
Payer: COMMERCIAL

## 2024-10-01 VITALS
HEIGHT: 65 IN | OXYGEN SATURATION: 96 % | RESPIRATION RATE: 16 BRPM | HEART RATE: 77 BPM | DIASTOLIC BLOOD PRESSURE: 80 MMHG | SYSTOLIC BLOOD PRESSURE: 122 MMHG | WEIGHT: 222.66 LBS | TEMPERATURE: 96.9 F | BODY MASS INDEX: 37.1 KG/M2

## 2024-10-01 DIAGNOSIS — G89.29 CHRONIC MIDLINE LOW BACK PAIN WITHOUT SCIATICA: ICD-10-CM

## 2024-10-01 DIAGNOSIS — M54.50 CHRONIC MIDLINE LOW BACK PAIN WITHOUT SCIATICA: ICD-10-CM

## 2024-10-01 PROCEDURE — 99213 OFFICE O/P EST LOW 20 MIN: CPT | Performed by: STUDENT IN AN ORGANIZED HEALTH CARE EDUCATION/TRAINING PROGRAM

## 2024-10-01 PROCEDURE — 3074F SYST BP LT 130 MM HG: CPT | Performed by: STUDENT IN AN ORGANIZED HEALTH CARE EDUCATION/TRAINING PROGRAM

## 2024-10-01 PROCEDURE — 3079F DIAST BP 80-89 MM HG: CPT | Performed by: STUDENT IN AN ORGANIZED HEALTH CARE EDUCATION/TRAINING PROGRAM

## 2024-10-01 RX ORDER — CEPHALEXIN 500 MG/1
CAPSULE ORAL
COMMUNITY
Start: 2024-06-24 | End: 2024-10-01

## 2024-10-01 RX ORDER — HYDROCORTISONE ACETATE 25 MG/1
SUPPOSITORY RECTAL
COMMUNITY
Start: 2024-08-12 | End: 2024-10-01

## 2024-10-01 RX ORDER — LUMATEPERONE 42 MG/1
CAPSULE ORAL
COMMUNITY
Start: 2024-07-18

## 2025-05-12 RX ORDER — SUMATRIPTAN 50 MG/1
TABLET, FILM COATED ORAL
Qty: 20 TABLET | Refills: 2 | Status: SHIPPED | OUTPATIENT
Start: 2025-05-12 | End: 2025-05-22 | Stop reason: SDUPTHER

## 2025-05-16 ENCOUNTER — APPOINTMENT (OUTPATIENT)
Dept: MEDICAL GROUP | Facility: PHYSICIAN GROUP | Age: 35
End: 2025-05-16
Payer: COMMERCIAL

## 2025-05-27 RX ORDER — SUMATRIPTAN 50 MG/1
TABLET, FILM COATED ORAL
Qty: 20 TABLET | Refills: 2 | Status: SHIPPED | OUTPATIENT
Start: 2025-05-27

## 2025-06-06 ENCOUNTER — OFFICE VISIT (OUTPATIENT)
Dept: MEDICAL GROUP | Facility: PHYSICIAN GROUP | Age: 35
End: 2025-06-06
Payer: COMMERCIAL

## 2025-06-06 VITALS
WEIGHT: 226.63 LBS | TEMPERATURE: 97.5 F | DIASTOLIC BLOOD PRESSURE: 70 MMHG | HEIGHT: 65 IN | HEART RATE: 94 BPM | RESPIRATION RATE: 14 BRPM | SYSTOLIC BLOOD PRESSURE: 96 MMHG | BODY MASS INDEX: 37.76 KG/M2 | OXYGEN SATURATION: 97 %

## 2025-06-06 DIAGNOSIS — G43.819 OTHER MIGRAINE WITHOUT STATUS MIGRAINOSUS, INTRACTABLE: Primary | ICD-10-CM

## 2025-06-06 PROCEDURE — 3074F SYST BP LT 130 MM HG: CPT | Performed by: STUDENT IN AN ORGANIZED HEALTH CARE EDUCATION/TRAINING PROGRAM

## 2025-06-06 PROCEDURE — 99213 OFFICE O/P EST LOW 20 MIN: CPT | Performed by: STUDENT IN AN ORGANIZED HEALTH CARE EDUCATION/TRAINING PROGRAM

## 2025-06-06 PROCEDURE — 3078F DIAST BP <80 MM HG: CPT | Performed by: STUDENT IN AN ORGANIZED HEALTH CARE EDUCATION/TRAINING PROGRAM

## 2025-06-06 RX ORDER — SUMATRIPTAN 50 MG/1
TABLET, FILM COATED ORAL
Qty: 27 TABLET | Refills: 3 | Status: SHIPPED | OUTPATIENT
Start: 2025-06-06

## 2025-06-06 RX ORDER — BUSPIRONE HYDROCHLORIDE 15 MG/1
TABLET ORAL
COMMUNITY
Start: 2025-05-02

## 2025-06-09 NOTE — PROGRESS NOTES
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 female, established patient who presents today to discuss medical problems as listed below:    History of Present Illness  The patient is a 35-year-old female presenting for follow-up on migraines.    She reports increased migraine frequency, attributed to discontinuation of birth control. Migraines cluster around her menstrual cycle, with 3 episodes last month and 1 episode this month over 2 days. No visual disturbances or auras. Migraines are unilateral, localized, with nausea and photophobia. She has exhausted her sumatriptan 50 mg tablets, occasionally taking 2 during prolonged episodes.    Currently on propranolol, dosage increased from 80 mg to 120 mg but reverted to 80 mg due to side effects. No dizziness reported. Also taking Lamictal for bipolar disorder.    Not on birth control, planning to conceive, and taking prenatal vitamins.    PAST SURGICAL HISTORY:  - IUD removal       Current Medications and Prescriptions Ordered in Epic[1]    Review of systems:  Per HPI    Patient Active Problem List    Diagnosis Date Noted    Chronic midline low back pain without sciatica 10/01/2024    Exercise-induced asthma 09/04/2024    Obesity (BMI 30-39.9) 04/04/2024    Migraine 09/28/2023    Bipolar 2 disorder (HCC) 09/28/2023    Other insomnia 09/28/2023    IUD (intrauterine device) in place 09/28/2023    Family history of breast cancer 09/28/2023     Past Surgical History[2]  Social History[3]   Family History   Problem Relation Age of Onset    Breast Cancer Mother         No genetic marker    Psychiatric Illness Mother     Alcohol abuse Father         In recovery    Hypertension Brother     Psychiatric Illness Paternal Grandmother     Drug abuse Brother         Methamphetamines, anything he could get his hands on    Alcohol abuse Brother      Current Medications[4]    Allergies:  Allergies[5]    Allergies, past  "medical history, past surgical history, family history, social history reviewed and updated.    Objective:    BP 96/70   Pulse 94   Temp 36.4 °C (97.5 °F) (Temporal)   Resp 14   Ht 1.651 m (5' 5\")   Wt 103 kg (226 lb 10.1 oz)   SpO2 97%   BMI 37.71 kg/m²    Body mass index is 37.71 kg/m².    Physical exam:  General: Normal appearance, no acute distress, not ill-appearing  HEENT: EOM intact, conjunctiva normal limits, negative right/left eye discharge.  Sclera anicteric  Cardiovascular: Normal rate and rhythm, no murmurs  Pulmonary: No respiratory distress, no wheezing, no rales, breath sounds normal.  Musculoskeletal: No edema bilaterally  Skin: Warm, dry, no lesions  Neurological: No focal deficits, normal gait  Psychiatric: Mood within normal limits    Assessment/Plan:    Assessment & Plan  1. Migraines: Increased frequency likely related to discontinuation of birth control.  - Experienced 3 migraines last month, 1 this month.  - Prescription for sumatriptan 50 mg tablets, max 200 mg/day, sent to [facility] with 3 refills (27 tablets/year).  - If rejected, send to alternative pharmacy.  - Continue propranolol, safe during pregnancy if this occurs .  - Avoid Imitrex if pregnant.      Follow-up  - Annual visit possibly in January.       Problem List Items Addressed This Visit       Migraine - Primary    Relevant Medications    SUMAtriptan (IMITREX) 50 MG Tab       Return in about 7 months (around 1/6/2026), or if symptoms worsen or fail to improve.        [1]   Current Outpatient Medications Ordered in Epic   Medication Sig Dispense Refill    busPIRone (BUSPAR) 15 MG tablet       SUMAtriptan (IMITREX) 50 MG Tab Take 1 tablet by mouth at onset of migraine headache. May repeat every 2 hours if migraine is not relieve. Do not exceed 4 tablets in 24 hours. 27 Tablet 3    lamoTRIgine (LAMICTAL) 100 MG Tab Take 2 tablets by mouth 2 times per day      traZODone (DESYREL) 100 MG Tab Take 150 mg by mouth.      " albuterol 108 (90 Base) MCG/ACT Aero Soln inhalation aerosol Inhale 2 puffs by mouth 30 minutes before sports or exercise to prevent cough or wheezing. May repeat every 4 hours as needed for cough or wheezing. 100 days supply is 1 canister      propranolol CR (INDERAL LA) 80 MG CAPSULE SR 24 HR TAKE 1 CAPSULE DAILY 90 Capsule 3     No current Epic-ordered facility-administered medications on file.   [2] History reviewed. No pertinent surgical history.  [3]   Social History  Tobacco Use    Smoking status: Never    Smokeless tobacco: Never   Vaping Use    Vaping status: Never Used   Substance Use Topics    Alcohol use: Yes     Comment: 2-3/month    Drug use: Yes     Frequency: 2.0 times per week     Types: Marijuana   [4]   Current Outpatient Medications   Medication Sig Dispense Refill    busPIRone (BUSPAR) 15 MG tablet       SUMAtriptan (IMITREX) 50 MG Tab Take 1 tablet by mouth at onset of migraine headache. May repeat every 2 hours if migraine is not relieve. Do not exceed 4 tablets in 24 hours. 27 Tablet 3    lamoTRIgine (LAMICTAL) 100 MG Tab Take 2 tablets by mouth 2 times per day      traZODone (DESYREL) 100 MG Tab Take 150 mg by mouth.      albuterol 108 (90 Base) MCG/ACT Aero Soln inhalation aerosol Inhale 2 puffs by mouth 30 minutes before sports or exercise to prevent cough or wheezing. May repeat every 4 hours as needed for cough or wheezing. 100 days supply is 1 canister      propranolol CR (INDERAL LA) 80 MG CAPSULE SR 24 HR TAKE 1 CAPSULE DAILY 90 Capsule 3     No current facility-administered medications for this visit.   [5]   Allergies  Allergen Reactions    Avocado Unspecified, Nausea and Vomiting    Sulfamethoxazole W-Trimethoprim Unspecified, Nausea, Hives and Vomiting    Sulfa Drugs Unspecified and Rash

## 2025-06-13 DIAGNOSIS — G43.819 OTHER MIGRAINE WITHOUT STATUS MIGRAINOSUS, INTRACTABLE: ICD-10-CM

## 2025-06-13 RX ORDER — PROPRANOLOL HYDROCHLORIDE 80 MG/1
80 CAPSULE, EXTENDED RELEASE ORAL DAILY
Qty: 90 CAPSULE | Refills: 3 | Status: SHIPPED | OUTPATIENT
Start: 2025-06-13

## 2025-06-13 NOTE — TELEPHONE ENCOUNTER
Received request via: Patient    Was the patient seen in the last year in this department? Yes    Does the patient have an active prescription (recently filled or refills available) for medication(s) requested? No    [facility]    Does the patient have senior living Plus and need 100-day supply? (This applies to ALL medications) Patient does not have SCP